# Patient Record
Sex: MALE | Race: OTHER | URBAN - METROPOLITAN AREA
[De-identification: names, ages, dates, MRNs, and addresses within clinical notes are randomized per-mention and may not be internally consistent; named-entity substitution may affect disease eponyms.]

---

## 2019-03-02 ENCOUNTER — OFFICE VISIT (OUTPATIENT)
Dept: FAMILY MEDICINE CLINIC | Facility: CLINIC | Age: 39
End: 2019-03-02

## 2019-03-02 VITALS
HEIGHT: 68 IN | WEIGHT: 207.38 LBS | OXYGEN SATURATION: 98 % | BODY MASS INDEX: 31.43 KG/M2 | TEMPERATURE: 98 F | SYSTOLIC BLOOD PRESSURE: 132 MMHG | RESPIRATION RATE: 20 BRPM | DIASTOLIC BLOOD PRESSURE: 78 MMHG | HEART RATE: 72 BPM

## 2019-03-02 DIAGNOSIS — J02.9 SORE THROAT: ICD-10-CM

## 2019-03-02 DIAGNOSIS — J11.1 INFLUENZA-LIKE ILLNESS: Primary | ICD-10-CM

## 2019-03-02 LAB
CONTROL LINE PRESENT WITH A CLEAR BACKGROUND (YES/NO): YES YES/NO
KIT EXPIRATION DATE: NORMAL DATE
STREP GRP A CUL-SCR: NEGATIVE

## 2019-03-02 PROCEDURE — 87880 STREP A ASSAY W/OPTIC: CPT | Performed by: NURSE PRACTITIONER

## 2019-03-02 PROCEDURE — 99202 OFFICE O/P NEW SF 15 MIN: CPT | Performed by: NURSE PRACTITIONER

## 2019-03-02 NOTE — PATIENT INSTRUCTIONS
Gargle with warm salt water every few hours. Mix 1/2 tsp salt in 1 cup of warm water. Throat coat tea and honey may help your sore throat.    You may take ibuprofen 600mg every 6 hours with a little food and may add acetaminophen 650mg every 6 hours as decongestant if you have high blood pressure. · Stay home until your fever has been gone for at least 24 hours without using medicine to reduce fever.   Follow-up care  Follow up with your healthcare provider, or as advised, if you are not getting better o

## 2019-03-02 NOTE — PROGRESS NOTES
Patient presents with:  Sore Throat  Cough  :    HPI:   Edel Peña is a 34year old male who presents for upper respiratory symptoms for  approx 60 hrs. Started suddenly on 2/27 in the evening.   Reports sudden onset of tactile fever, chills, he THROAT: oral mucosa pink, moist. No visible dental caries. Posterior pharynx is mildly erythematous. No exudates. NECK: supple, non-tender  LUNGS: clear to auscultation bilaterally, no wheezes or rhonchi. Breathing is non labored.   No cough during exam  C The flu starts 1 to 3 days after you are exposed to the flu virus. It may last for 1 to 2 weeks but many people feel tired or fatigued for many weeks afterward. You usually don’t need to take antibiotics unless you have a complication.  This might be an ear · Cough with lots of colored mucus (sputum) or blood in your mucus  · Chest pain, shortness of breath, wheezing, or trouble breathing  · Severe headache, or face, neck, or ear pain  · New rash with fever  · Fever of 100.4°F (38°C) or higher, or as directed